# Patient Record
Sex: FEMALE | URBAN - METROPOLITAN AREA
[De-identification: names, ages, dates, MRNs, and addresses within clinical notes are randomized per-mention and may not be internally consistent; named-entity substitution may affect disease eponyms.]

---

## 2018-06-25 ENCOUNTER — OFFICE VISIT (OUTPATIENT)
Dept: URBAN - METROPOLITAN AREA CLINIC 29 | Facility: CLINIC | Age: 58
End: 2018-06-25
Payer: COMMERCIAL

## 2018-06-25 PROCEDURE — 92004 COMPRE OPH EXAM NEW PT 1/>: CPT | Performed by: OPTOMETRIST

## 2018-06-25 ASSESSMENT — INTRAOCULAR PRESSURE
OD: 16
OS: 16

## 2018-06-25 NOTE — IMPRESSION/PLAN
Impression: Other subjective visual disturbances: H53.19. OU. Normal Ocular Health without signs of disease OU. Plan: Discussed diagnosis in detail with patient. Discussed treatment options with patient. Consult recommended [Neurologist]. Will continue to observe condition and or symptoms. Reassured patient of current condition and treatment.

## 2018-07-06 ENCOUNTER — OFFICE VISIT (OUTPATIENT)
Dept: URBAN - METROPOLITAN AREA CLINIC 29 | Facility: CLINIC | Age: 58
End: 2018-07-06
Payer: COMMERCIAL

## 2018-07-06 DIAGNOSIS — H54.8 LEGAL BLINDNESS, AS DEFINED IN USA: ICD-10-CM

## 2018-07-06 PROCEDURE — 99214 OFFICE O/P EST MOD 30 MIN: CPT | Performed by: OPTOMETRIST

## 2018-07-06 PROCEDURE — 92083 EXTENDED VISUAL FIELD XM: CPT | Performed by: OPTOMETRIST

## 2018-07-06 ASSESSMENT — INTRAOCULAR PRESSURE
OD: 15
OS: 15

## 2018-07-06 NOTE — IMPRESSION/PLAN
Impression: Legal blindness, as defined in [place]: H54.8. OU. Plan: Discussed diagnosis in detail with patient.

## 2018-07-06 NOTE — IMPRESSION/PLAN
Impression: Generalized contraction of visual field, bilateral: H53.483. OU. Plan: Discussed diagnosis in detail with patient. Discussed treatment options with patient. Consult recommended [Neurologist]. Will continue to observe condition and or symptoms. Reassured patient of current condition and treatment.

## 2019-07-24 ENCOUNTER — OFFICE VISIT (OUTPATIENT)
Dept: URBAN - METROPOLITAN AREA CLINIC 29 | Facility: CLINIC | Age: 59
End: 2019-07-24
Payer: COMMERCIAL

## 2019-07-24 DIAGNOSIS — H53.483 GENERALIZED CONTRACTION OF VISUAL FIELD, BILATERAL: ICD-10-CM

## 2019-07-24 DIAGNOSIS — H53.19 OTHER SUBJECTIVE VISUAL DISTURBANCES: Primary | ICD-10-CM

## 2019-07-24 PROCEDURE — 92014 COMPRE OPH EXAM EST PT 1/>: CPT | Performed by: OPTOMETRIST

## 2019-07-24 ASSESSMENT — INTRAOCULAR PRESSURE
OD: 14
OS: 14

## 2019-07-24 NOTE — IMPRESSION/PLAN
Impression: Generalized contraction of visual field, bilateral: H53.483 OU. Plan: Discussed diagnosis in detail with patient.

## 2019-07-24 NOTE — IMPRESSION/PLAN
Impression: Other subjective visual disturbances: H53.19 OU. Condition: established, worsening. Plan: Discussed diagnosis in detail with patient. Discussed treatment options with patient. Follow up recommended [Neurologist]. Will continue to observe condition and or symptoms. Reassured patient of current condition and treatment. Discussed risks of progression. Call if symptoms worsens.